# Patient Record
Sex: FEMALE | Race: WHITE | NOT HISPANIC OR LATINO | URBAN - METROPOLITAN AREA
[De-identification: names, ages, dates, MRNs, and addresses within clinical notes are randomized per-mention and may not be internally consistent; named-entity substitution may affect disease eponyms.]

---

## 2020-01-14 ENCOUNTER — OFFICE VISIT (OUTPATIENT)
Dept: FAMILY MEDICINE | Facility: CLINIC | Age: 25
End: 2020-01-14
Payer: COMMERCIAL

## 2020-01-14 VITALS
SYSTOLIC BLOOD PRESSURE: 120 MMHG | HEART RATE: 80 BPM | DIASTOLIC BLOOD PRESSURE: 77 MMHG | OXYGEN SATURATION: 100 % | BODY MASS INDEX: 18.01 KG/M2 | HEIGHT: 61 IN | RESPIRATION RATE: 16 BRPM | WEIGHT: 95.4 LBS | TEMPERATURE: 98.1 F

## 2020-01-14 DIAGNOSIS — R87.612 LOW GRADE SQUAMOUS INTRAEPITHELIAL LESION ON CYTOLOGIC SMEAR OF CERVIX (LGSIL): Primary | ICD-10-CM

## 2020-01-14 DIAGNOSIS — R87.618 ABNORMAL PAPANICOLAOU SMEAR OF CERVIX WITH POSITIVE HUMAN PAPILLOMA VIRUS (HPV) TEST: ICD-10-CM

## 2020-01-14 LAB — HCG UR QL: NEGATIVE

## 2020-01-14 RX ORDER — CLONAZEPAM 0.5 MG/1
TABLET ORAL
COMMUNITY
Start: 2019-04-09

## 2020-01-14 RX ORDER — MIRTAZAPINE 15 MG/1
TABLET, FILM COATED ORAL
COMMUNITY
Start: 2019-12-21

## 2020-01-14 SDOH — HEALTH STABILITY: MENTAL HEALTH: HOW OFTEN DO YOU HAVE A DRINK CONTAINING ALCOHOL?: NEVER

## 2020-01-14 ASSESSMENT — MIFFLIN-ST. JEOR: SCORE: 1115.11

## 2020-01-14 NOTE — LETTER
January 29, 2020      Ade Keenan  [address]  [facility] [address]        Dear Ade,    Thank you for getting your care at [facility]. Please see below for your test results.    Your HPV was positive (as we discussed) but not 16 or 18.    Your pap showed LSIL again.   Your biopsies all were OK - which is great!  Reminder to repeat your pap and HPV in 1 year and then if you need another colposcopy, plan to pretreat with vaginal estrogen for 2 weeks.     Resulted Orders   HCG Qualitative Urine (UPT) ([facility])   Result Value Ref Range    HCG Qual Urine NEGATIVE Negative   Surgical pathology exam   Result Value Ref Range    Copath Report       Patient Name: ADE KEENAN  MR#: [id number]  Specimen #:   Collected: 1/14/2020  Received: 1/15/2020  Reported: 1/16/2020 07:19  Ordering Phy(s): VALDO MATHIAS    For improved result formatting, select 'View Enhanced Report Format' under   Linked Documents section.    SPECIMEN(S):  A: Mucosa, vaginal 3 o clock  B: Cervical biopsy, 6 o'clock  C: Cervical biopsy, 2 o'clock  D: Endocervical curettings    FINAL DIAGNOSIS:  A. Vaginal mucosa, 3 o clock, biopsy:  -Stratified squamous epithelium with slight parakeratosis and no evidence   of dysplasia or malignancy.    B. Cervix, 6 o'clock, biopsy:  -Stratified squamous epithelium and small areas of glandular epithelium   with no evidence of dysplasia or  malignancy.    C. Cervix, 2 o'clock, biopsy:  - Cervical glandular and squamous epithelium with no evidence of dysplasia   or malignancy.    D. Cervix, endocervical curettings:  - Fragments of cervical glandular and squamous epithelium with no evidence   of dysplasia or malignancy.     Electronically signed out by:    Steven Vuong M.D., PhD    CLINICAL HISTORY:  24 year old female.  LSIL and positive HPV.    GROSS:  Four specimen containers with formalin are received labeled with the   patient's name, date of birth, and  medical record number.   "Information on the requisition slip, containers,   and associated labels is confirmed.    A. The specimen is designated \"vaginal mucosa 3:00\". The specimen consists   of a single pale tan soft tissue  fragment measuring 0.3 cm in greatest dimension. The specimen is entirely   submitted in one cassette.    B. The specimen is designated \"cervical biopsy 6:00\". The specimen   consists of a single pale tan soft tissue  fragment measuring 0.4 cm in greatest dimension. The specimen is entirely   submitted in one cassette.    C. The specimen is designated \"cervical biopsy 2:00\". The specimen   consists of a single pale tan soft tissue  fragment measuring 0.3 cm in greatest dimension. The specimen is entirely   submitted in one c assette.    D. The specimen is designated \"endocervical curettings\" consisting of a   Cytobrush in formalin from which a 0.3  cm aggregate of mucoid translucent to tan non-cohesive material is   obtained. The entire specimen is filtered  over a tissue wrap. The Cytobrush is scraped and is submitted with the   filtered tissue.  Entirely submitted in  one cassette. (Dictated by: Daphnie Culp 1/15/2020 01:17 PM)    MICROSCOPIC:  Microscopic examination is performed.    The technical component of this testing was completed at the [facility], with the professional component performed   at the [address] [phone] ([phone])    CPT Codes:  A: 61861-KK1  B: 88444-YW4  C: 34234-FJ9  D: 39842-CS2    COLLECTION SITE:  Client: [facility]  Location: [facility] (B)     HPV High Risk Types DNA Cervical   Result Value Ref Range    HPV Source SurePath     HPV 16 DNA Negative NEG^Negative    HPV 18 DNA Negative NEG^Negative    Other HR HPV Positive (A) NEG^Negative    Final Diagnosis       This patient's sample is positive for " other HR HPV DNA (types 31, 33, 35, 39, 45, 51, 52,   56, 58, 59, 66 or 68), not HPV 16 or HPV 18 DNA. This result requires clinical correlation   with concurrent cytology findings.        Comment:      This test was developed and its performance characteristics determined by the   [facility]. It   has not been cleared or approved by the FDA. The laboratory is regulated under   CLIA as qualified to perform high-complexity testing. This test is used for   clinical purposes. It should not be regarded as investigational or for   research.  (Note)  METHODOLOGY:  The Roche jesenia 4800 system uses automated extraction,   simultaneous amplification of HPV (L1 region) and beta-globin,    followed by  real time detection of fluorescent labeled HPV and beta   globin using specific oligonucleotide probes . The test specifically   identifies types HPV 16 DNA and HPV 18 DNA while concurrently   detecting the rest of the high risk types (31, 33, 35, 39, 45, 51,   52, 56, 58, 59, 66 or 68).  COMMENTS:  This test is not intended for use as a screening device   for women under age 30 with normal cervical cytology.  Results should   be correl ated with cytologic and histologic findings. Close clinical   followup is recommended.      Specimen Description Cervical Cells       Comment:      C20 [id number]   Pap imaged thin layer diagnostic with HPV (select HPV order below)   Result Value Ref Range    PAP LSIL (A)     Copath Report         Acc#: [id number]   Signed: 1/19/2020 11:17   MR#: [id number]    SPECIMEN/STAIN PROCESS:  Pap Imaged thin layer prep diagnostic (SurePath, FocalPoint with guided   screening)       Pap-Cyto x 2, HPV ordered x 1    SOURCE: Cervical, endocervical  ----------------------------------------------------------------   Pap Imaged thin layer prep diagnostic (SurePath, FocalPoint with guided   screening)  SPECIMEN ADEQUACY:  Satisfactory for  evaluation.  -Transformation zone component present.    CYTOLOGIC INTERPRETATION:    Epithelial cell abnormality:  squamous cell:  low-grade squamous   intraepithelial lesion (LSIL)    Electronically signed by: Steven Vuong M.D., PhD    CLINICAL HISTORY:  LMP: 12/14/2019  Previous LGSIL, History of positive HPV, Colposcopy,    Papanicolaou Test Limitations:  Cervical cytology is a screening test with   limited sensitivity; regular  screening is critical for cancer prevention; Pap tests are primarily   effective for the diagnosis/prevention of  squamous cell  carcinoma, not adenocarcinomas or other cancers.    The technical component of this testing was completed at the [facility], with the professional component performed   at the [facility], [address] [place] [phone] ([phone])           If you have any concerns about these results please call and leave a message for me or send a MyChart message to the clinic.    Sincerely,    Romelia Moy MD

## 2020-01-14 NOTE — PROGRESS NOTES
[facility]   Colposcopy Procedure Note    History:  Verena Mendoza is a patient of Dr. Monsalve Ref-Primary, Physician that  presents for colposcopy for LSIL and HPV +.  Reports colposcopy at age 18 and found to have BV and pap smear cleared after treatment. She does not remember what her pap was.   STI history: negative  Contraception  nexplanon  Smoking?  No  Taking folate?   No  ASA in last week? No  NSAID taken today? No    Consent: Affirmation of informed consent signed and scanned into medical record. Risks, benefits and alternatives discussed. Patient's questions were elicited and answered.  Procedure safety checklist was completed:  Yes  Time Out (Pause for the Cause) completed: Yes    Labs:   UPT:  Negative    Procedure:  Patient positioned for colposcopy. Acetic acid applied. Lugol's applied.   SCJ seen entirely? No - os is tight and diffuse AWE up to the os.   Elba was not satisfactory. with biopsy and ECC  cervix swabbed with Lugol's solution, endocervical speculum placed, endocervical curettage performed, cervical biopsies taken at 6 and 2 o'clock, vaginal biopsy taken on her left vaginal wall a cm or so from the fornix, specimen labelled and sent to pathology and hemostasis achieved with Monsel's solution  EBL: minimal    Findings:   Vagina   Large diffuse AWE change noted surrounding cervix that was lugol's negative down half her vagina, with lugol's positive closer to vulva. Biopsy taken from her left vaginal wall, about 1 cm from the fornix    Vulva  vulvar colposcopy not performed    Cervix  Diffuse AWE changes - not able to see discrete lesion, that extends over entire cervix and down vaginal walls. No vessel changes.  Biopsy done at 2:00 and 6:00 and ECC   Colposcopic Impression: Dysplasia - difficult to categorize due to diffuse and extensive reach.      Tolerance:   Patient tolerated procedure well    Plan:  Follow up in 2 weeks for biopsy results.  Non- smoker  Discharge instructions  discussed including RTC or call if bleeding >1pph, fever, abdominal pain or foul smelling discharge.       Resident: Hamida Bryson MD  Faculty: Romelia Moy MD present for and supervised this entire procedure

## 2020-01-15 ENCOUNTER — TELEPHONE (OUTPATIENT)
Dept: FAMILY MEDICINE | Facility: CLINIC | Age: 25
End: 2020-01-15

## 2020-01-15 NOTE — PROGRESS NOTES
Preceptor Attestation:   Patient seen, evaluated and discussed with the resident. I was present for and supervised the entire procedure. I have verified the content of the note, which accurately reflects my assessment of the patient and the plan of care.   Supervising Physician:  Romelia Moy MD.

## 2020-01-15 NOTE — RESULT ENCOUNTER NOTE
Discussed the following results during the clinic visit. See progress note for details.     Romelia Moy MD

## 2020-01-15 NOTE — TELEPHONE ENCOUNTER
"Per Dr. Moy \"Hi - she needs to be seen back to review her colpo results.  Can you help that happen? Thanks, Veena \"    RN called and left  with name and callback number.     Please schedule 2 week follow up when pt calls back    Sophia PASTOR Gonzalez    "

## 2020-01-16 LAB — COPATH REPORT: NORMAL

## 2020-01-19 LAB
COPATH REPORT: ABNORMAL
PAP: ABNORMAL

## 2020-01-21 LAB
FINAL DIAGNOSIS: ABNORMAL
HPV HR 12 DNA CVX QL NAA+PROBE: POSITIVE
HPV16 DNA SPEC QL NAA+PROBE: NEGATIVE
HPV18 DNA SPEC QL NAA+PROBE: NEGATIVE
SPECIMEN DESCRIPTION: ABNORMAL
SPECIMEN SOURCE CVX/VAG CYTO: ABNORMAL

## 2020-01-29 ENCOUNTER — OFFICE VISIT (OUTPATIENT)
Dept: FAMILY MEDICINE | Facility: CLINIC | Age: 25
End: 2020-01-29
Payer: COMMERCIAL

## 2020-01-29 VITALS
RESPIRATION RATE: 16 BRPM | OXYGEN SATURATION: 98 % | HEART RATE: 74 BPM | BODY MASS INDEX: 18.52 KG/M2 | SYSTOLIC BLOOD PRESSURE: 125 MMHG | DIASTOLIC BLOOD PRESSURE: 85 MMHG | TEMPERATURE: 98.5 F | WEIGHT: 98 LBS

## 2020-01-29 DIAGNOSIS — N89.8 VAGINAL DISCHARGE: ICD-10-CM

## 2020-01-29 DIAGNOSIS — F33.41 RECURRENT MAJOR DEPRESSIVE DISORDER, IN PARTIAL REMISSION (H): ICD-10-CM

## 2020-01-29 DIAGNOSIS — R87.612 LOW GRADE SQUAMOUS INTRAEPITHELIAL LESION ON CYTOLOGIC SMEAR OF CERVIX (LGSIL): Primary | ICD-10-CM

## 2020-01-29 LAB
BACTERIA: NORMAL
CLUE CELLS: NORMAL
MOTILE TRICHOMONAS: NEGATIVE
ODOR: NORMAL
PH WET PREP: <4.5 (ref 3.8–4.5)
WBC WET PREP: NORMAL (ref 2–5)
YEAST: NORMAL

## 2020-01-29 NOTE — LETTER
January 29, 2020      Verena Mendoza  [address]  [facility] [address]        Dear Verena,    Thank you for getting your care at [facility]. Please see below for your test results.  Your Wet prep is OK. No BV.     Resulted Orders   Wet Prep (\[facility]\"")   Result Value Ref Range    Yeast Wet Prep None none    Motile Trichomonas Wet Prep Negative Negative    Clue Cells Wet Prep Present <20% NONE    WBC WET PREP None 2 - 5    Bacteria Wet Prep Few None    pH Wet Prep <4.5 3.8 - 4.5    Odor Wet Prep None NONE           Sincerely,    Romelia Moy MD

## 2020-01-29 NOTE — PATIENT INSTRUCTIONS
Here is the plan from today's visit    1. Vaginal discharge  Your discharge is fine.  Your pH is normal so that essentially rules out BV.  I suspect those pieces of dark discharge will stop very soon.   - Wet Prep ([facility])    2. Low grade squamous intraepithelial lesion on cytologic smear of cervix (LGSIL)  Start the Folic Acid 1mg daily to help the HPV go away.         Please call or return to clinic if your symptoms don't go away.    Follow up plan  1 year for another pap and HPV test. If that test is abnormal, then you will need another colposcopy and should use estrogen cream in your vagina for the 2 weeks before you have the colposcopy.     Thank you for coming to [facility] today.  Lab Testing:  **If you had lab testing today and your results are reassuring or normal they will be mailed to you or sent through [employer] within 7 days.   **If the lab tests need quick action we will call you with the results.  The phone number we will call with results is # [phone] (home) . If this is not the best number please call our clinic and change the number.  Medication Refills:  If you need any refills please call your pharmacy and they will contact us.   If you need to  your refill at a new pharmacy, please contact the new pharmacy directly. The new pharmacy will help you get your medications transferred faster.   Scheduling:  If you have any concerns about today's visit or wish to schedule another appointment please call our office during normal business hours [phone] (8-5:00 M-F)  If a referral was made to a [facility] and you don't get a call from central scheduling please call [phone].  If a Mammogram was ordered for you at [facility] call [phone] to schedule or change your appointment.  If you had an XRay/CT/Ultrasound/MRI ordered the number is [phone] to schedule or change your radiology appointment.   Medical Concerns:  If you have urgent medical  concerns please call [phone] at any time of the day.    Romelia Moy MD

## 2020-01-29 NOTE — PROGRESS NOTES
HPI       Verena is a 24 year old  female  who presents for the new concern(s) of:    Chief Complaint   Patient presents with     RECHECK     follow up results from COLP     HPV Vaccine - wanting to do this.   Non smoker  Any vaginal meds - none    Wondering if she has BV.  Is still seeing pieces of scab and yellowish discharge with a slight odor since her colpo.  Had BV about a year ago.  Not much vaginal dryness - has had less sexual interest on her new antidepressant. No partner since November and has STI testing since then at [facility].   Gets her care at [place] and her colpo with us.    Nexplanon since November      There is no problem list on file for this patient.      Current Outpatient Medications   Medication Sig Dispense Refill     clonazePAM (KLONOPIN) 0.5 MG tablet Take 1/2 to 1 tab PO TID PRN for anxiety       mirtazapine (REMERON) 15 MG tablet TK 1/2 T PO QHS FOR 1 WEEK. INCREASE TO 1 T PO QHS            Allergies   Allergen Reactions     Roche[place] Oil Angioedema and Swelling     Penicillins Other (See Comments)                Review of Systems:                 Physical Exam:     Vitals:    01/29/20 1311   BP: 125/85   Pulse: 74   Resp: 16   Temp: 98.5  F (36.9  C)   TempSrc: Oral   SpO2: 98%   Weight: 44.5 kg (98 lb)     Body mass index is 18.52 kg/m .  Vitals were reviewed and were normal  : nl vulva and vagina. Clear discharge.  Some bleeding from the os    Results for orders placed or performed in visit on 01/29/20   Wet Prep ([facility])     Status: None   Result Value Ref Range    Yeast Wet Prep None none    Motile Trichomonas Wet Prep Negative Negative    Clue Cells Wet Prep Present <20% NONE    WBC WET PREP None 2 - 5    Bacteria Wet Prep Few None    pH Wet Prep <4.5 3.8 - 4.5    Odor Wet Prep None NONE          Assessment and Plan     Veerna was seen today for recheck.    Diagnoses and all orders for this visit:    Vaginal discharge - doubt this is BV. Talked about condoms  helping with BV and that I will send her the results by [employer].   -     Wet Prep ([facility])    Low grade squamous intraepithelial lesion on cytologic smear of cervix (LGSIL) - she will return in 1 year for HPV and Pap. Reviewed her colpo findings with our gyn colleagues and most likely has some atrophy - ? From the Nexplanon. If she needs colpo next time -should pretreat with estrogen topical for 2 weeks.   Aware she needs 3 shots of the HPV before she turns 25  Comments:  1/2020: Colpo with atrophy. No lesion identified. Plan: repeat pap and HPV in year.     Recurrent major depressive disorder, in partial remission (H) - not managed by us.     Other orders -  -     ADMIN VACCINE, INITIAL  -     HPV9 (Gardasil 9 )        There are no discontinued medications.    Total time: 25 minutes with more than half spent counseling on HPV and her vaginal symptoms.        Options for treatment and follow-up care were reviewed with the patient . Verena Mendoza  engaged in the decision making process and verbalized understanding of the options discussed and agreed with the final plan.    Romelia Moy MD

## 2022-04-19 NOTE — Clinical Note
Hi - she needs to be seen back to review her colpo results.  Can you help that happen? Thanks, Veena [As Noted in HPI] : as noted in HPI [Negative] : Heme/Lymph